# Patient Record
Sex: MALE | Race: OTHER
[De-identification: names, ages, dates, MRNs, and addresses within clinical notes are randomized per-mention and may not be internally consistent; named-entity substitution may affect disease eponyms.]

---

## 2021-01-01 ENCOUNTER — HOSPITAL ENCOUNTER (EMERGENCY)
Dept: HOSPITAL 15 - ER | Age: 26
Discharge: HOME | End: 2021-01-01
Payer: MEDICAID

## 2021-01-01 VITALS — SYSTOLIC BLOOD PRESSURE: 115 MMHG | DIASTOLIC BLOOD PRESSURE: 79 MMHG

## 2021-01-01 DIAGNOSIS — F32.9: ICD-10-CM

## 2021-01-01 DIAGNOSIS — U07.1: Primary | ICD-10-CM

## 2021-01-01 DIAGNOSIS — F41.9: ICD-10-CM

## 2021-01-01 DIAGNOSIS — J03.90: ICD-10-CM

## 2021-01-01 PROCEDURE — 71045 X-RAY EXAM CHEST 1 VIEW: CPT

## 2021-01-01 PROCEDURE — 99284 EMERGENCY DEPT VISIT MOD MDM: CPT

## 2021-01-01 PROCEDURE — 96372 THER/PROPH/DIAG INJ SC/IM: CPT

## 2022-02-04 ENCOUNTER — HOSPITAL ENCOUNTER (EMERGENCY)
Dept: HOSPITAL 15 - ER | Age: 27
Discharge: HOME | End: 2022-02-04
Payer: MEDICAID

## 2022-02-04 VITALS — HEIGHT: 69 IN | BODY MASS INDEX: 31.1 KG/M2 | WEIGHT: 210 LBS

## 2022-02-04 VITALS — SYSTOLIC BLOOD PRESSURE: 130 MMHG | DIASTOLIC BLOOD PRESSURE: 75 MMHG
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
## 2022-02-04 DIAGNOSIS — S80.12XA: ICD-10-CM

## 2022-02-04 DIAGNOSIS — S16.1XXA: Primary | ICD-10-CM
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
## 2022-02-04 DIAGNOSIS — R51.9: ICD-10-CM
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
## 2022-02-04 PROCEDURE — 73590 X-RAY EXAM OF LOWER LEG: CPT

## 2022-02-04 PROCEDURE — 70450 CT HEAD/BRAIN W/O DYE: CPT

## 2025-02-23 ENCOUNTER — HOSPITAL ENCOUNTER (EMERGENCY)
Dept: HOSPITAL 15 - ER | Age: 30
Discharge: HOME | End: 2025-02-23
Payer: MEDICAID

## 2025-02-23 VITALS
HEART RATE: 68 BPM | DIASTOLIC BLOOD PRESSURE: 74 MMHG | TEMPERATURE: 98 F | SYSTOLIC BLOOD PRESSURE: 132 MMHG | OXYGEN SATURATION: 95 % | RESPIRATION RATE: 18 BRPM

## 2025-02-23 VITALS — OXYGEN SATURATION: 98 % | RESPIRATION RATE: 16 BRPM | HEART RATE: 78 BPM

## 2025-02-23 VITALS — HEIGHT: 70 IN | BODY MASS INDEX: 38.28 KG/M2 | WEIGHT: 267.42 LBS

## 2025-02-23 DIAGNOSIS — R10.84: Primary | ICD-10-CM

## 2025-02-23 DIAGNOSIS — R11.2: ICD-10-CM

## 2025-02-23 DIAGNOSIS — Z79.899: ICD-10-CM

## 2025-02-23 LAB
ALBUMIN SERPL-MCNC: 4.8 G/DL (ref 3.2–4.8)
ALP SERPL-CCNC: 69 U/L (ref 46–116)
ALT SERPL-CCNC: 56 U/L (ref 7–40)
ANION GAP SERPL CALCULATED.3IONS-SCNC: 8 MMOL/L (ref 5–15)
BILIRUB SERPL-MCNC: 0.5 MG/DL (ref 0.2–1)
BUN SERPL-MCNC: 13 MG/DL (ref 9–23)
BUN/CREAT SERPL: 13.4 (ref 10–20)
CALCIUM SERPL-MCNC: 10.1 MG/DL (ref 8.7–10.4)
CHLORIDE SERPL-SCNC: 106 MMOL/L (ref 98–107)
CO2 SERPL-SCNC: 27 MMOL/L (ref 20–31)
GLUCOSE SERPL-MCNC: 86 MG/DL (ref 74–106)
HCT VFR BLD AUTO: 42 % (ref 41–53)
HGB BLD-MCNC: 14.1 G/DL (ref 13.5–17.5)
LIPASE SERPL-CCNC: 51 U/L (ref 12–53)
MCH RBC QN AUTO: 28.9 PG (ref 28–32)
MCV RBC AUTO: 86.6 FL (ref 80–100)
NRBC BLD QL AUTO: 0.1 %
POTASSIUM SERPL-SCNC: 3.8 MMOL/L (ref 3.5–5.1)
PROT SERPL-MCNC: 7.5 G/DL (ref 5.7–8.2)
SODIUM SERPL-SCNC: 141 MMOL/L (ref 136–145)

## 2025-02-23 PROCEDURE — 36415 COLL VENOUS BLD VENIPUNCTURE: CPT

## 2025-02-23 PROCEDURE — 74176 CT ABD & PELVIS W/O CONTRAST: CPT

## 2025-02-23 PROCEDURE — 85025 COMPLETE CBC W/AUTO DIFF WBC: CPT

## 2025-02-23 PROCEDURE — 83605 ASSAY OF LACTIC ACID: CPT

## 2025-02-23 PROCEDURE — 80053 COMPREHEN METABOLIC PANEL: CPT

## 2025-02-23 PROCEDURE — 83690 ASSAY OF LIPASE: CPT

## 2025-02-23 PROCEDURE — 96375 TX/PRO/DX INJ NEW DRUG ADDON: CPT

## 2025-02-23 PROCEDURE — 96374 THER/PROPH/DIAG INJ IV PUSH: CPT

## 2025-02-23 PROCEDURE — 81001 URINALYSIS AUTO W/SCOPE: CPT

## 2025-02-23 PROCEDURE — 96361 HYDRATE IV INFUSION ADD-ON: CPT

## 2025-02-23 PROCEDURE — 99285 EMERGENCY DEPT VISIT HI MDM: CPT

## 2025-02-23 RX ADMIN — SODIUM CHLORIDE ONE MLS/HR: 0.9 INJECTION, SOLUTION INTRAVENOUS at 15:21

## 2025-02-23 RX ADMIN — ONDANSETRON HYDROCHLORIDE ONE MG: 2 INJECTION, SOLUTION INTRAMUSCULAR; INTRAVENOUS at 15:23

## 2025-02-23 RX ADMIN — MORPHINE SULFATE ONE MG: 4 INJECTION, SOLUTION INTRAMUSCULAR; INTRAVENOUS at 15:24

## 2025-02-23 NOTE — DVH
Exam: CT CT AB PEL WO CON-NO ORAL OR IV



History: abd pain n/v h/o sbo



Comparison Study: None available at time of dictation.



TECHNIQUE: Multidetector CT of the abdomen was performed from lung bases to pubic symphysis. Imaging 
was performed without IV contrast. Axial, coronal and sagittal multiplanar reformats were obtained fr
om the axial data set by the technologist.



Radiation Dose Information:



CT Dose: CTDI volume is 23.52 mGy. Dose-length product is 1317.91 mGy*cm



FINDINGS: 



Evaluation of solid organs is limited due to lack of intravenous contrast use.



Findings: 



Lung Bases: No acute or significant lung base finding.  Normal heart size. No pleural or pericardial 
effusion.  



Liver: The liver is normal in size.  No focal lesions. 



Gallbladder and Biliary Tree: Unremarkable



Spleen: Unremarkable



Pancreas: The pancreas is grossly normal in appearance. 



Adrenal Glands: Unremarkable 



Kidneys: Punctate calculus left kidney no hydronephrosis punctate calculus lower pole right kidney no
 hydronephrosis



Bladder: Grossly unremarkable for degree of distention.



Bowel: The stomach is grossly normal in appearance. Small bowel and colon are normal in caliber and d
istribution.  The appendix is not visualized; however, no secondary findings of acute appendicitis id
entified. 



Ascites: Absent



Lymphadenopathy: No mesenteric, retroperitoneal or periportal lymphadenopathy. 



Abdominal Wall and Mesentery: Unremarkable.



Vasculature: The visualized abdominal aorta is normal in size and caliber.  Evaluation of abdominal a
nd pelvic vessels is limited due to lack of intravenous contrast.



Pelvic Organs: Unremarkable



Musculoskeletal: No aggressive focal bony lesions, acute fractures or dislocation.   



Soft tissues: Unremarkable



IMPRESSION:



1. Punctate nonobstructing renal calculi bilaterally.



2. No findings of bowel obstruction



3. No cholelithiasis gallbladder is not dilated.



 



 



Radiation optimization: All CT scans at this facility use at least one of these dose optimization chad
hniques: automated exposure control  mA and/or kV adjustment per patient size (includes targeted exam
s where dose is matched to clinical indication)  or iterative reconstruction.



Electronically Signed by: Kun Loza at 02/23/2025 14:30:48 PM

## 2025-02-23 NOTE — ED.PDOC
GI ASSESSMENT


HPI Comments


30 y.o male present to the ED for a chief complaint of diffuse abdominal pain 

associated with nausea and vomiting that started earlier today. Patient 

describes pain as sharp, constant and has no alleviating or precipitating 

factors. Patient reports similar pain on 2024, was diagnosed with an 

enlarged intestine but unsure of final diagnosis. Patient denies any diarrhea, 

states he had a normal soft bowel movement this morning. He also denies fever, 

chills, hematuria, dysuria, or rectal bleeding.


Chief Complaint:  Abdominal Pain


Time Seen by MD:  13:55


Primary Care Provider:  NONE


Reviewed Notes:  Nurses Notes, Medications, Allergies


Allergies:  


Coded Allergies:  


     NO KNOWN ALLERGIES (Unverified , 11)


Home Meds


Active Scripts


Methocarbamol (Methocarbamol) 750 Mg Tab, 750 MG PO QHSP PRN for 20 Days, #20 

TAB


   Prov:MILTON MORGAN         22


Ibuprofen (Ibuprofen) 800 Mg Tab, 800 MG PO TID PRN for 10 Days, #30 TAB


   Prov:MILTON MORGAN         22


Reported Medications


Albuterol (Albuterol)  Pow


   11


Information Source:  Patient


Mode of Arrival:  Ambulatory


Timing:  Hours


Duration:  Since onset


Quality:  Sharp


Vomitus:  Hard


Stool:  Normal


Severity:  Moderate


Recent:  None


Recent Hx of:  None


Pain Location:  Diffuse


Associated sign and symptoms:  Nausea, Vomiting, Abdominal Pain





Past Medical History


PAST MEDICAL HISTORY:  Anxiety, Depression


Surgical History:  Denies all surgeries





Family History


Family History:  Reviewed,noncontributory to illness, No family hx of DM, No 

family hx of HTN





Social History


Smoker:  Non-Smoker


Alcohol:  Denies ETOH Use


Drugs:  Denies Drug Use


Lives In:  Home





Constitutional:  denies: chills, diaphoresis, fatigue, fever, malaise, sweats, 

weakness, others


EENTM:  denies: blurred vision, double vision, ear bleeding, ear discharge, ear 

drainage, ear pain, ear ringing, eye pain, eye redness, hearing loss, mouth 

pain, mouth swelling, nasal discharge, nose bleeding, nose congestion, nose 

pain, photophobia, tearing, throat pain, throat swelling, voice changes, others


Respiratory:  denies: cough, hemoptysis, orthopnea, SOB at rest, shortness of 

breath, SOB with excertion, stridor, wheezing, others


Cardiovascular:  denies: chest pain, dizzy spells, diaphoresis, Dyspnea on 

exertion, edema, irregular heart beat, left arm pain, lightheadedness, 

palpitations, PND, syncope, others


Gastrointestinal:  reports: abdominal pain, nausea, vomiting; denies: abdomen 

distended, blood streaked bowels, constipated, diarrhea, dysphagia, difficulty 

swallowing, hematemesis, melena, poor appetite, poor fluid intake, rectal 

bleeding, rectal pain, others


Genitourinary:  denies: burning, dysuria, flank pain, frequency, hematuria, 

incontinence, penile discharge, penile sore, pain, testicle pain, testicle 

swelling, urgency, others


Neurological:  denies: dizziness, fainting, headache, left sided numbness, left 

sided weakness, numbness, paresthesia, pre-existing deficit, right sided 

numbness, right sided weakness, seizure, speech problems, tingling, tremors, 

weakness, others


Musculoskeletal:  denies: back pain, gout, joint pain, joint swelling, muscle 

pain, muscle stiffness, neck pain, others


Integumetry:  denies: bruises, change in color, change in hair/nails, dryness, 

laceration, lesions, lumps, rash, wounds, others


Allergic/Immunocompromised:  denies: Difficulty Healing, Frequent Infections, 

Hives, Itching, others


Hematologic/Lymphatic:  denies: anemia, blood clots, easy bleeding, easy 

bruising, swollen glands, others


Endocrine:  denies: excessive hunger, excessive sweating, excessive thirst, 

excessive urination, flushing, intolerance to cold, intolerance to heat, une

xplained weight gain, unexplained weight loss, others


Psychiatric:  denies: anxiety, bipolar disorder, depression, hopeless, panic 

disorder, schizophrenia, sleepless, suicidal, others


All Other Systems:  Reviewed and Negative





Physical Exam


General Appearance:  No Apparent Distress


HEENT:  Other (Moist Mucous membranes, face and pupils symmetric)


Neck:  Full Range of Motion, Normal Inspection


Respiratory:  Lungs Clear, No Accessory Muscle Use, No Respiratory Distress, 

Normal Breath Sounds


Cardiovascular:  No Edema, No JVD, Regular Rate/Rhythm


Breast Exam:  Deferred


Gastrointestinal:  Diffuse, Distended (Mild), Soft, Tenderness


Genitalia:  Deferred


Pelvic:  Deferred


Rectal:  Deferred


Extremities:  Normal inspection, Normal range of motion, Non-tender, No pedal 

edema


Neurologic:  Alert (Oriented x4), Normal Affect, Normal Mood, Other (Ambulatory 

without difficulty.  No gross focal deficit.)


Cerebellar Function:  NOT DONE


Reflexes:  NOT DONE


Skin:  Dry, Normal Color, Warm


Lymphatic:  NOT DONE





Was a procedure done?


Was a procedure done?:  No





GI differential Dx


Differential Diagnosis:  Bowel Obstruction, Constipation, Diverticular disease, 

Esophagitis, Gastritis/PUD, Gastroenteritis, Hepatitis, Inflammatory BD, 

Ischemic Bowel, Pancreatitis, UTI, Dehydration, Electrolyte Imbalance, Food 

Poisoning, Bacterial, Viral, Impaction, Renal Failure, Stress Ulcer





X-Ray, Labs, Meds, VS





                                   Vital Signs








  Date Time  Temp Pulse Resp B/P (MAP) Pulse Ox O2 Delivery O2 Flow Rate FiO2


 


25 18:14 97.7 63 16 120/66 (84) 97   





 97.7       


 


25 15:54  70 17 129/80    


 


25 15:28  78 16  98 Room Air* 0 21


 


25 15:28 98.4 78 16 117/83 (94) 98   





 98.4       


 


25 15:24  78 18 106/69    


 


25 14:01 98.0 87 18 124/74 (91) 97   








                                       Lab








Test


 25


17:00 25


14:46 Range/Units


 


 


Urine Color Light-yellow   Yellow  


 


Urine Clarity Clear   Clear  


 


Urine pH 5.5   5.0-9.0  


 


Urine Specific Gravity 1.019   1.001-1.035  


 


Urine Protein Negative   Negative  


 


Urine Ketones Negative   Negative  


 


Urine Blood Negative   Negative  /uL


 


Urine Nitrite Negative   Negative  


 


Urine Bilirubin Negative   Negative  


 


Urine Urobilinogen Normal   Negative  mg/dL


 


Urine Leukocyte Esterase Negative   Negative  /uL


 


Urine RBC 1   0 - 3  /hpf


 


Urine Microscopic WBC 2   0-3  /HPF


 


Urine Squamous Epithelial


Cells None seen 


 


 <5  /hpf





 


Urine Bacteria None seen   None Seen  /hpf


 


Urine Mucus Few   None Seen  


 


Urine Glucose Normal   Normal  mg/dL


 


White Blood Count


 


 12.3 H


 4.4-10.8


10^3/uL


 


Red Blood Count


 


 4.86 


 4.5-5.90


10^6/uL


 


Hemoglobin  14.1  13.5-17.5  g/dL


 


Hematocrit  42.0  41.0-53.0  %


 


Mean Corpuscular Volume  86.6  80.0-100.0  fL


 


Mean Corpuscular Hemoglobin  28.9  28.0-32.0  pg


 


Mean Corpuscular Hemoglobin


Concent 


 33.4 


 32.0-36.0  g/dL





 


Red Cell Distribution Width  13.7  11.8-14.3  %


 


Platelet Count


 


 304 


 140-450


10^3/uL


 


Mean Platelet Volume  8.0  6.9-10.8  fL


 


Neutrophils (%) (Auto)  61.1  37.0-80.0  %


 


Lymphocytes (%) (Auto)  29.8  10.0-50.0  %


 


Monocytes (%) (Auto)  7.4  0.0-12.0  %


 


Eosinophils (%) (Auto)  1.2  0.0-7.0  %


 


Basophils (%) (Auto)  0.5  0.0-2.0  %


 


Neutrophils # (Auto)


 


 7.5 


 1.6-8.6  10


^3/uL


 


Lymphocytes # (Auto)


 


 3.7 


 0.4-5.4  10


^3/uL


 


Monocytes # (Auto)  0.9  0-1.3  10 ^3/uL


 


Eosinophils # (Auto)  0.1  0-0.8  10 ^3/uL


 


Basophils # (Auto)  0.1  0-0.2  10 ^3/uL


 


Nucleated Red Blood Cells  0.1   %


 


Sodium Level  141  136-145  mmol/L


 


Potassium Level  3.8  3.5-5.1  mmol/L


 


Chloride Level  106    mmol/L


 


Carbon Dioxide Level  27  20-31  mmol/L


 


Anion Gap  8  5-15  


 


Blood Urea Nitrogen  13  9-23  mg/dL


 


Creatinine


 


 0.97 


 0.700-1.30


mg/dL


 


Glomerular Filtration Rate


Calc 


 108 


 >90  mL/min





 


BUN/Creatinine Ratio  13.4  10.0-20.0  


 


Serum Glucose  86    mg/dL


 


Lactic Acid Level  0.7  0.4-2.0  mmol/L


 


Calcium Level  10.1  8.7-10.4  mg/dL


 


Total Bilirubin  0.5  0.2-1.0  mg/dL


 


Aspartate Amino Transferase


(AST) 


 39 


 13-40  U/L





 


Alanine Aminotransferase (ALT)  56 H 7-40  U/L


 


Alkaline Phosphatase  69    U/L


 


Total Protein  7.5  5.7-8.2  g/dL


 


Albumin  4.8  3.2-4.8  g/dL


 


Lipase  51  12-53  U/L








                               Current Medications








 Medications


  (Trade)  Dose


 Ordered  Sig/Erika


 Route  Start Time


 Stop Time Status Last Admin


 


 Sodium Chloride  1,000 ml @ 


 1,000 mls/hr  Q1H ONCE


 IV  2/23/25 14:00


 25 14:59 DC 25 15:21





 


 Morphine Sulfate  4 mg  ONCE  ONCE


 IV  25 14:00


 25 14:01 DC 25 15:24





 


 Ondansetron HCl


  (Zofran)  4 mg  ONCE  ONCE


 IV  25 14:00


 25 14:01 DC 25 15:23








[place] [address]


                              Ph: [phone]





                               DIAGNOSTIC IMAGING


                      Diagnostic Imaging Report : [id number]


                                     Signed








PATIENT: CONNIE JOHNSON     ACCT: [id number]        UNIT: [id number]


: 1995           LOC: ER                   ROOM / BED:  / 


AGE / SEX: 30 / M         ADM STATUS: REG ER        SERVICE DT: 25 0077





ORDERING PHYSICIAN: KEVIN ALLEN MD


PROCEDURE(s): ABPL - CT AB PEL WO CON-NO ORAL OR IV


REASON: abd pain n/v h/o sbo


ORDER NUMBER(s): [id number], ACCESSION NUMBER(s): [id number]








Exam: CT CT AB PEL WO CON-NO ORAL OR IV





History: abd pain n/v h/o sbo





Comparison Study: None available at time of dictation.





TECHNIQUE: Multidetector CT of the abdomen was performed from lung bases to 

pubic symphysis. Imaging was performed without IV contrast. Axial, coronal and 

sagittal multiplanar reformats were obtained from the axial data set by the 

technologist.





Radiation Dose Information:





CT Dose: CTDI volume is 23.52 mGy. Dose-length product is 1317.91 mGy*cm





FINDINGS: 





Evaluation of solid organs is limited due to lack of intravenous contrast use.





Findings: 





Lung Bases: No acute or significant lung base finding.  Normal heart size. No 

pleural or pericardial effusion.  





Liver: The liver is normal in size.  No focal lesions. 





Gallbladder and Biliary Tree: Unremarkable





Spleen: Unremarkable





Pancreas: The pancreas is grossly normal in appearance. 





Adrenal Glands: Unremarkable 





Kidneys: Punctate calculus left kidney no hydronephrosis punctate calculus lower

pole right kidney no hydronephrosis





Bladder: Grossly unremarkable for degree of distention.





Bowel: The stomach is grossly normal in appearance. Small bowel and colon are 

normal in caliber and distribution.  The appendix is not visualized; however, no

secondary findings of acute appendicitis identified. 





Ascites: Absent





Lymphadenopathy: No mesenteric, retroperitoneal or periportal lymphadenopathy. 





Abdominal Wall and Mesentery: Unremarkable.





Vasculature: The visualized abdominal aorta is normal in size and caliber.  

Evaluation of abdominal and pelvic vessels is limited due to lack of intravenous

contrast.





Pelvic Organs: Unremarkable





Musculoskeletal: No aggressive focal bony lesions, acute fractures or 

dislocation.   





Soft tissues: Unremarkable





IMPRESSION:





1. Punctate nonobstructing renal calculi bilaterally.





2. No findings of bowel obstruction





3. No cholelithiasis gallbladder is not dilated.





 





 





Radiation optimization: All CT scans at this facility use at least one of these 

dose optimization techniques: automated exposure control  mA and/or kV 

adjustment per patient size (includes targeted exams where dose is matched to 

clinical indication)  or iterative reconstruction.





Electronically Signed by: Dom Patel at 2025 14:30:48 PM











DICTATED BY: DOM PATEL Jr., DO


DICTATED DATE/TIME: 25 1430





SIGNED BY: DOM PATEL Jr., 


SIGNED DATE/TIME: 25 143





CC: 


X-Ray, Labs, Meds, VS Comment


30-year-old male with a history of anxiety and depression complaining of 

abdominal pain, nausea and vomiting


Vitals unremarkable 


Exam unremarkable 


Rhythm strip independently interpreted by me: Sinus rhythm, rate 87, no ectopy. 




CT abdomen and pelvis: 


IMPRESSION:





1. Punctate nonobstructing renal calculi bilaterally.





2. No findings of bowel obstruction





3. No cholelithiasis gallbladder is not dilated.





CBC remarkable for WBC 12.3, metabolic panel unremarkable, lipase normal, 

lactate normal, UA unremarkable 


Patient treated with the following in the ED:


1 L 0.9 normal saline IV bolus, morphine 4 mg IV, Zofran 4 mg IV


On re-evaluation, patient states pain has improved.  Vitals were stable.  Repeat

abdominal exam was benign.  He tolerated p.o. fluids.  


Hospitalization was considered, however patient had rapid improvement of 

symptoms with treatment in the ED, and I no longer feel hospitalization is 

necessary.  Patient now appears stable for discharge with close outpatient 

follow-up with his primary physician for referral to a gastroenterologist.


Rx omeprazole, Bentyl, Zofran


Time of 1ST Reevaluation:  14:53


Reevaluation 1ST:  Unchanged


Patient Education/Counseling:  Diagnosis, Treatment, Prognosis


Family Education/Counseling:  No Family Present





Departure 1


Departure


Time of Disposition:  19:49


Impression:  


   Primary Impression:  


   Abdominal pain


   Qualified Codes:  R10.9 - Unspecified abdominal pain


   Additional Impression:  


   Nausea & vomiting


   Qualified Codes:  R11.2 - Nausea with vomiting, unspecified


Disposition:   HOME / SELF CARE / HOMELESS


Condition:  Stable





Additional Instructions:  


Your blood and urine tests were unremarkable.  Your CT scan was unremarkable for

any abnormality that would explain your symptoms.  I have prescribed pain 

medication, medication for nausea and vomiting.  Follow-up with your primary 

doctor in 1-2 days for referral to a gastroenterologist for further evaluation 

of your symptoms.  Alternatively, follow-up with Dr. Barrios.


e-Prescriptions


Dicyclomine Hcl (BENTYL CAPSULE) 10 Mg Cp


2 CAP PO Q6HP PRN, #30 CAP 11 Refills


   prn abdominal pain


   Prov: KEVIN ALLEN MD         25 


Omeprazole Magnesium (Omeprazole) 20 Mg Tab


20 MG PO DAILY, #30 TAB


   Prov: KEVIN ALLEN MD         25 


Ondansetron Odt 4MG Tab (ZOFRAN PO) 4 Mg Tb


4 MG PO TID PRN, #30 TAB


   prn n/v


   ODT TAB-DISSOLVE IN MOUTH,


   THEN SWALLOW


   Prov: KEVIN ALLEN MD         25


Discharged With:  Relative





Critical Care Note


Critical Care Time?:  No





Stability


Stability form required:  No





Heart Score


Heart Score:  








Heart Score Response (Comments) Value


 


History N/A 0


 


EKG N/A 0


 


Age N/A 0


 


Risk Factors N/A 0


 


Troponin N/A 0


 


Total  0














I personally scribed for KVEIN ALLEN MD ([email or web address]) on 25 at 

14:58.  Electronically submitted by Barb Luna ([facility]).


I personally scribed for KEVIN ALLEN MD (MING) on 25 at 

15:06.  Electronically submitted by Barb Luna ([facility]).





KEVIN ALLEN MD    2025 14:58